# Patient Record
Sex: FEMALE | Race: WHITE
[De-identification: names, ages, dates, MRNs, and addresses within clinical notes are randomized per-mention and may not be internally consistent; named-entity substitution may affect disease eponyms.]

---

## 2019-09-14 ENCOUNTER — HOSPITAL ENCOUNTER (EMERGENCY)
Dept: HOSPITAL 75 - ER | Age: 34
Discharge: HOME | End: 2019-09-14
Payer: SELF-PAY

## 2019-09-14 VITALS — HEIGHT: 64.96 IN | BODY MASS INDEX: 26.08 KG/M2 | WEIGHT: 156.53 LBS

## 2019-09-14 VITALS — DIASTOLIC BLOOD PRESSURE: 94 MMHG | SYSTOLIC BLOOD PRESSURE: 125 MMHG

## 2019-09-14 DIAGNOSIS — F17.210: ICD-10-CM

## 2019-09-14 DIAGNOSIS — J00: Primary | ICD-10-CM

## 2019-09-14 DIAGNOSIS — J45.909: ICD-10-CM

## 2019-09-14 PROCEDURE — 87804 INFLUENZA ASSAY W/OPTIC: CPT

## 2019-09-14 NOTE — ED COUGH/URI
General


Chief Complaint:  Abdominal/GI Problems


Stated Complaint:  FEELING SICK


Nursing Triage Note:  


C/O BEING SOA VOMITING WITH COUGH. FOR 3DAYS


Sepsis Screen:  No Definite Risk


Source:  patient


Exam Limitations:  no limitations





History of Present Illness


Date Seen by Provider:  Sep 14, 2019


Time Seen by Provider:  18:29


Initial Comments


Patient presents to ER by private conveyance with chief complaint for the past 3

days she's had some coughing nausea vomiting no fevers or chills but bodyaches 

general malaise. She used some Tylenol this morning. She has a history of asthma

and feels that she can't breathe. She uses her albuterol but it does not help. 

No productive cough.





Allergies and Home Medications


Allergies


Coded Allergies:  


     No Known Drug Allergies (Unverified , 9/14/19)





Patient Home Medication List


Home Medication List Reviewed:  Yes





Review of Systems


Review of Systems


Constitutional:  No chills, No diaphoresis, No fever; malaise


EENTM:  nose congestion; No ear discharge, No ear pain


Respiratory:  cough; No phlegm; short of breath; No wheezing


Gastrointestinal:  No abdominal pain, No constipation, No diarrhea; nausea (none

presently); No vomiting


Genitourinary:  No discharge, No dysuria


Pregnant:  No


Musculoskeletal:  No back pain, No joint pain





Past Medical-Social-Family Hx


Patient Social History


Alcohol Use:  Denies Use


Recreational Drug Use:  No


Smoking Status:  Current Everyday Smoker


Type Used:  Cigarettes


Recent Foreign Travel:  No


Contact w/Someone Who Travel:  No


Recent Infectious Disease Expo:  No





Physical Exam





Vital Signs - First Documented








 9/14/19





 18:28


 


Temp 37.6


 


Pulse 83


 


Resp 18


 


B/P (MAP) 116/85 (95)


 


Pulse Ox 99


 


O2 Delivery Room Air





Capillary Refill : Less Than 3 Seconds


Height: '"


Weight: lbs. oz. kg; 26.00 BMI


Method:


General Appearance:  WD/WN, mild distress


Eyes:  Bilateral Eye Normal Inspection, Bilateral Eye PERRL, Bilateral Eye EOMI


HEENT:  PERRL/EOMI, TM abnormal (R) (mild injection but no erythema, clear 

mucoid effusion seen. No bulging or tenderness), other (mild retropharyngeal 

injection/erythema without exudate)


Respiratory:  lungs clear, normal breath sounds, no respiratory distress, no 

accessory muscle use, other (coughing)


Cardiovascular:  normal peripheral pulses, regular rate, rhythm


Gastrointestinal:  non tender, soft


Neurologic/Psychiatric:  alert, normal mood/affect, oriented x 3


Skin:  normal color, warm/dry





Progress/Results/Core Measures


Suspected Sepsis


Recent Fever Within 48 Hours:  No


Infection Criteria Present:  Suspected New Infection


New/Unexplained  Altered Menta:  No


Sepsis Screen:  No Definite Risk


SIRS


Temperature: 


Pulse: 83 


Respiratory Rate: 18


 


Blood Pressure 116 /85 


Mean: 95





Results/Orders


Micro Results





Microbiology


9/14/19 Influenza Types A,B Antigen (ATTILA) - Final, Complete


          





My Orders





Orders - ANH LICEA


Influenza A And B Antigens (9/14/19 18:39)


Naproxen Tablet (Naprosyn Tablet) (9/14/19 18:45)





Medications Given in ED





Current Medications








 Medications  Dose


 Ordered  Sig/Severiano


 Route  Start Time


 Stop Time Status Last Admin


Dose Admin


 


 Naproxen  500 mg  ONCE  ONCE


 PO  9/14/19 18:45


 9/14/19 18:46 DC 9/14/19 19:01


500 MG








Vital Signs/I&O











 9/14/19





 18:28


 


Temp 37.6


 


Pulse 83


 


Resp 18


 


B/P (MAP) 116/85 (95)


 


Pulse Ox 99


 


O2 Delivery Room Air





Capillary Refill : Less Than 3 Seconds








Blood Pressure Mean:                    95








Progress Note :  


   Time:  18:44


Progress Note


Influenza swab. Naprosyn. She doesn't have any wheezing or diminished breath 

sounds. The reason her albuterol is not helping his because she's not probably 

actually having an acute exacerbation of her asthma. This seems to be more upper

airway involvement possible influenza versus bronchitis. Would not be opposed to

some steroids if the influenza is negative. Maci Mcmahon conservative care





Departure


Impression





   Primary Impression:  


   Nasopharyngitis infective


   Additional Impression:  


   Asthma


   Qualified Codes:  J45.21 - Mild intermittent asthma with (acute) exacerbation


Disposition:  01 HOME, SELF-CARE


Condition:  Stable





Departure-Patient Inst.


Decision time for Depature:  19:15


Patient Instructions:  Cough, Runny Nose, and the Common Cold (DC)





Add. Discharge Instructions:  


Plenty of fluids to drink. You will need of rest.


Tylenol 1000 mg every 8 hours as needed for body aches or chills.


Naproxen 2 tablets twice a day or ibuprofen 4 tablets 3 times a day as needed 

for body aches fever or chills.


Use the pro-air if you're having coughing fits as prescribed.


Tessalon Perles 1 capsule every 6 hours as needed for cough.


Vapor rubs, humidifiers, hot tea with honey or abdomen.


For nasal congestion or runny nose you can use chlorpheniramine 4 mg every 4 ho

urs as needed.


All discharge instructions reviewed with patient and/or family. Voiced 

understanding.


Scripts


Prednisone (Prednisone) 20 Mg Tab


40 MG PO DAILY for 5 Days, #10 TAB 0 Refills


   Prov: ANH LICEA         9/14/19 


Benzonatate (Tessalon Perle) 100 Mg Capsule


100 MG PO Q6H PRN for COUGH, #20 CAP 0 Refills


   Prov: ANH LICEA         9/14/19


Work/School Note:  Work Release Form   Date Seen in the Emergency Department:  

Sep 14, 2019


   Return to Work:  Sep 16, 2019


   Restrictions:  No Restrictions











ANH LICEA                 Sep 14, 2019 18:47

## 2019-09-19 ENCOUNTER — HOSPITAL ENCOUNTER (EMERGENCY)
Dept: HOSPITAL 75 - ER | Age: 34
Discharge: HOME | End: 2019-09-19
Payer: SELF-PAY

## 2019-09-19 VITALS — WEIGHT: 160.28 LBS | HEIGHT: 62.99 IN | BODY MASS INDEX: 28.4 KG/M2

## 2019-09-19 VITALS — SYSTOLIC BLOOD PRESSURE: 106 MMHG | DIASTOLIC BLOOD PRESSURE: 77 MMHG
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
## 2019-09-19 DIAGNOSIS — F17.210: ICD-10-CM

## 2019-09-19 DIAGNOSIS — Z90.89: ICD-10-CM

## 2019-09-19 DIAGNOSIS — J45.909: ICD-10-CM

## 2019-09-19 DIAGNOSIS — S92.415A: Primary | ICD-10-CM

## 2019-09-19 DIAGNOSIS — S91.312A: ICD-10-CM

## 2019-09-19 DIAGNOSIS — Z23: ICD-10-CM

## 2019-09-19 PROCEDURE — 73630 X-RAY EXAM OF FOOT: CPT

## 2019-09-19 PROCEDURE — 12002 RPR S/N/AX/GEN/TRNK2.6-7.5CM: CPT

## 2019-09-19 PROCEDURE — 90715 TDAP VACCINE 7 YRS/> IM: CPT

## 2019-09-19 NOTE — DIAGNOSTIC IMAGING REPORT
INDICATION: Dropped heavy object onto the left foot.



TIME OF EXAM: 12:54 PM



FINDINGS: 3 views left foot demonstrate acute fractures involving

the proximal phalanx of the great toe. There is a fracture at the

base of the middle phalanx, lateral side with communication with

the MTP joint. There are also transversely oriented fractures

through the mid and distal shaft of the proximal phalanx of the

great toe. Remaining phalanges appear to be intact. Metatarsals

are unremarkable. Midfoot and hindfoot are unremarkable.



IMPRESSION: Proximal phalangeal fractures of the great toe, as

described.



Dictated by: 



  Dictated on workstation # [id number]

## 2019-09-19 NOTE — ED LOWER EXTREMITY
General


Chief Complaint:  Laceration


Stated Complaint:  LEFT FOOT LACERATION


Nursing Triage Note:  


PT TO [facility] WITH COMPLAINT OF LACERATION TO LEFT FOOT. PT STATES SHE WAS 


CLEANING AROUND A BENCH[employer] BAR AND IT FELL ON FOOT. DENIES ANYOTHER INJURY. 


UNK LAST TETANUS


Nursing Sepsis Screen:  No Definite Risk


Source:  patient, family


Exam Limitations:  no limitations


 (RAAD HERMOSILLO STUDENT)





History of Present Illness


Date Seen by Provider:  Sep 19, 2019


Time Seen by Provider:  12:05


Initial Comments


The patient is a wd/wn 33 y/o female who is here with a chief complaint of a 

foot laceration. The interview is conducted with the help of her  due to 

her difficulty with talking. She is having intense pain in her left first 

metatarsal. She reports that she was down in her basement and moving a barbell 

with about 120 lbs of weight when it slipped and fell on her foot. She has no 

medical problems. She has no known allergies and is not currently taking any 

medications.


Onset:  just prior to arrival


Method of Injury:  direct blow


 (RAAD HERMOSILLO STUDENT)


Onset:  just prior to arrival (at least an hour ago)


Severity:  moderate


Method of Injury:  direct blow


Modifying Factors:  Improves With Immobilization; Worse With Movement 

(JOSE L PENALOZA MD)





Allergies and Home Medications


Allergies


Coded Allergies:  


     No Known Drug Allergies (Unverified , 19)





Home Medications


Benzonatate 100 Mg Capsule, 100 MG PO Q6H PRN for COUGH


   Prescribed by: ANH LICEA on 19


Prednisone 20 Mg Tab, 40 MG PO DAILY


   Prescribed by: ANH LICEA on 19





Patient Home Medication List


Home Medication List Reviewed:  Yes


 (JOSE L PENALOZA MD)





Review of Systems


Constitutional:  no symptoms reported


Respiratory:  no symptoms reported


Cardiovascular:  no symptoms reported (RAAD HERMOSILLO STUDENT)


Constitutional:  no symptoms reported


Respiratory:  no symptoms reported


Cardiovascular:  no symptoms reported


Musculoskeletal:  see HPI, joint pain, joint swelling, muscle pain


Skin:  change in color (JOSE L PENALOZA MD)





Past Medical-Social-Family Hx


Past Med/Social Hx:  Reviewed Nursing Past Med/Soc Hx


 (JOSE L PENALOZA MD)


Patient Social History


Alcohol Use:  Rarely Uses


Recreational Drug Use:  No


Smoking Status:  Current Everyday Smoker


Type Used:  Cigarettes


Recent Foreign Travel:  No


Contact w/Someone Who Travel:  No


Recent Infectious Disease Expo:  No


Recent Hopitalizations:  No


Physical Abuse:  No


Sexual Abuse:  No


Mistreated:  No


Fear:  No


 (RAAD HERMOSILLO STUDENT)





Immunizations Up To Date


Tetanus Booster (TDap):  Unknown


PED Vaccines UTD:  Yes


 (RAAD HERMOSILLO STUDENT)





Seasonal Allergies


Seasonal Allergies:  Yes


 (RAAD HERMOSILLO)





Past Medical History


Surgeries:  No


Gallbladder, Tonsillectomy


Respiratory:  Yes


Asthma


Cardiac:  No


Neurological:  No


Genitourinary:  No


Gastrointestinal:  No


Musculoskeletal:  No


Endocrine:  No


HEENT:  No


Cancer:  No


Psychosocial:  No


Integumentary:  No


Blood Disorders:  No


 (RAAD HERMOSILLO STUDENT)





Family Medical History


Reviewed Nursing Family Hx


 (JOSE L PENALOZA MD)


No Pertinent Family Hx


 (JOSE L PENALOZA MD)





Physical Exam


Vital Signs





Vital Signs - First Documented








 19





 11:56


 


Pulse 52


 


Resp 16


 


B/P (MAP) 95/64 (74)


 


Pulse Ox 97


 


O2 Delivery Room Air





 (JOSE L PENALOZA MD)


Vital Signs


Capillary Refill : Less Than 3 Seconds 


 (RAAD HERMOSILLO STUDENT)


Height, Weight, BMI


Height: '"


Weight: lbs. oz. kg; 28.00 BMI


Method:


General Appearance:  WD/WN, no apparent distress


Neck:  non-tender, full range of motion


Cardiovascular:  regular rate, rhythm, no murmur


Respiratory:  chest non-tender, lungs clear, normal breath sounds


Feet:  left foot abrasions/lacerations, left foot limited range of motion, left 

foot pain, left foot soft tissue tenderness, left foot swelling


Neurologic/Psychiatric:  alert, normal mood/affect, oriented x 3


Skin:  normal color, warm/dry (RAAD HERMOSILLO [employer] STUDENT)


General Appearance:  WD/WN, mild distress


Cardiovascular:  regular rate, rhythm, no murmur


Respiratory:  lungs clear, normal breath sounds


Feet:  left foot abrasions/lacerations (3 cm laceration proximal to the MTP on 

the left foot first digit. There is also circular appearing abrasion/Distal to 

the laceration)


Neurologic/Psychiatric:  alert, oriented x 3


Skin:  warm/dry, ecchymosis (distal left foot dorsum around the first MTP) 

(JOSE L PENALOZA MD)





Procedures/Interventions





   Wound Location:  Lower Extremities


Other Wound Location


Left foot dorsum proximal to the MTP first digit


   Wound Length (cm):  3


   Wound's Depth, Shape:  flap


   Wound Explored:  contaminated


   Irrigated w/ Saline (ccs):  250


   Betadine Prep?:  Yes


   Anesthesia:  1% Lidocaine


   Volume Anesthetic (ccs):  10


   Wound Debrided:  minimal


   Suture:  Prolene


   Suture Size:  4-0


   Number of Sutures:  8


   Layer Closure?:  1


   Number Deep Layer Sutures:  0


   Sterile Dressing Applied?:  Yes


Progress


Cleaned with soap and saline and flushed with normal saline. Anesthetized 

locally and digital block. Wound closed with good approximation. Tolerated 

procedure well with no complications.


 (JOSE L PENALOZA MD)





Progress/Results/Core Measures


Results/Orders


My Orders





Orders - JOSE L PENALOZA MD


Foot, Left, 3 Views (19 12:05)


Ketorolac Injection (Toradol Injection) (19 12:08)


Morphine  Injection (Morphine  Injection (19 12:08)


Dipht,Pertuss(Acell),Tet Adult (Boostrix (19 13:15)


Lidocaine 2% Injection 20 Ml (Xylocaine (19 13:15)


Lidocaine 1% Inj 20 Ml (Xylocaine 1% Inj (19 13:03)


 (JOSE L PENALOZA MD)


Medications Given in ED





Current Medications








 Medications  Dose


 Ordered  Sig/Severiano


 Route  Start Time


 Stop Time Status Last Admin


Dose Admin


 


 Lidocaine HCl  20 ml  STK-MED ONCE


 .ROUTE  19 13:03


 19 13:12 DC 19 13:22


20 ML





 (JOSE L PENALOZA MD)


Vital Signs/I&O











 19





 11:56


 


Pulse 52


 


Resp 16


 


B/P (MAP) 95/64 (74)


 


Pulse Ox 97


 


O2 Delivery Room Air





 (JOSE L PENALOZA MD)








Blood Pressure Mean:                    74











Progress


Progress Note :  


   Time:  12:15


Progress Note


The patient is experiencing extreme pain in her left foot. She will be 

administered morophine and ketorolac for pain control. Plain radiographic study 

of the left foot to evaluate for broken bones.  


 (ETCHESON,RAAD K MED STUDENT)


Progress Note :  


Progress Note


I have seen and evaluated the patient and agree with above except as indicated. 

Have directed the plan of care. Patient is here with wound to the dorsum of the 

left foot near the first MTP as described above. X-ray ordered. Morphine 8 mg IM

as well as Toradol 30 mg IM. Wound cleaned and anesthetized with 1% lidocaine 

locally and digital block. Wound closed with simple rapid sutures. Tolerated 

procedure well with no complications. Postop shoe given. Crutches given. Keflex 

500 mg by mouth. Tetanus updated. Discharge home with return precautions. 

Patient verbalize understanding instructions and agreement with plan.


 (JOSE L PENALOZA MD)





Diagnostic Imaging





   Diagonstic Imaging:  Xray


   Plain Films/CT/US/NM/MRI:  other


Comments


                 [facility] [place], [place]





NAME:   MUNIR KISER


MED REC#:   [id number]


ACCOUNT#:   [id number]


PT STATUS:   REG ER


:   1985


PHYSICIAN:   JOSE L PENALOZA MD


ADMIT DATE:   19/ER


                                   ***Draft***


Date of Exam:19





FOOT, LEFT, 3 VIEWS








INDICATION: Dropped heavy object onto the left foot.





TIME OF EXAM: 12:54 PM





FINDINGS: 3 views left foot demonstrate acute fractures involving


the proximal phalanx of the great toe. There is a fracture at the


base of the middle phalanx, lateral side with communication with


the MTP joint. There are also transversely oriented fractures


through the mid and distal shaft of the proximal phalanx of the


great toe. Remaining phalanges appear to be intact. Metatarsals


are unremarkable. Midfoot and hindfoot are unremarkable.





IMPRESSION: Proximal phalangeal fractures of the great toe, as


described.





  Dictated on workstation # [id number]








Dict:   19 1303


Trans:   19 1308


 [id number]





Interpreted by:     DOUGLAS RADER MD


Electronically signed by:


   Reviewed:  Reviewed by Me


 (JOSE L PENALOZA MD)





Departure


Impression





   Primary Impression:  


   Fracture of left great toe


   Qualified Codes:  S92.415A - Nondisplaced fracture of proximal phalanx of 

   left great toe, initial encounter for closed fracture


   Additional Impression:  


   Laceration of left foot


   Qualified Codes:  S91.312A - Laceration without foreign body, left foot, 

   initial encounter


Disposition:  01 HOME, SELF-CARE


Condition:  Improved





Departure-Patient Inst.


Decision time for Depature:  14:10


 (JOSE L PENALOZA MD)


Referrals:  


ROHITH GUILLORY MD,LILIYA CORNELIUS,YAMINI SHAFFER,ESTUARDO SHEETS DPM, MD


Patient Instructions:  Toe Fracture (DC), Laceration Repair With Stitches (DC)





Add. Discharge Instructions:  


All discharge instructions reviewed with patient and/or family. Voiced understa

nding.





Use antibiotic and dressing over wound and change twice daily for the next 4-5 

days. You may use dry Band-Aid over wound after that. Sutures out in 12-14 days.

Follow-up with one of the orthopedists listed or of your choosing within one 

week for recheck and further evaluation. You may take ibuprofen 600 mg every 8 

hours as needed for pain. You may take Tylenol/acetaminophen 1000 mg every 8 

hours as needed for pain if you're not taking the prescribed pain medicine. Do 

not take both at the same time as they both have acetaminophen in them. Use ice 

packs over area of concern 20 minutes per hour as needed for the next one to 2 d

ays. Elevate the foot. Use postop shoe at all times when walking for the next 

several weeks. Use crutches for the next week or 2 as needed to keep pressure 

off the foot. Return for worse pain, swelling, weakness, breathing problems or 

other concerns as needed.


Scripts


Cephalexin (Cephalexin) 500 Mg Tablet


500 MG PO QID, #20 TAB 0 Refills


   Prov: JOSE L PENALOZA MD         19 


Hydrocodone Bit/Acetaminophen (Hydrocodone/Acetaminophen 5/325mg Tablet) 1 Tab 

Tab


1 EACH PO Q6H PRN for PAIN-MODERATE MDD 10 for 3 Days, #12 TAB 0 Refills


   Prov: JOSE L PENALOZA MD         19











RAAD HERMOSILLO MED STUDENT   Sep 19, 2019 12:14


JOSE L PENALOZA MD          Sep 19, 2019 13:39

## 2019-10-03 ENCOUNTER — HOSPITAL ENCOUNTER (EMERGENCY)
Dept: HOSPITAL 75 - ER | Age: 34
Discharge: HOME | End: 2019-10-03
Payer: SELF-PAY

## 2019-10-03 VITALS — SYSTOLIC BLOOD PRESSURE: 119 MMHG | DIASTOLIC BLOOD PRESSURE: 74 MMHG

## 2019-10-03 DIAGNOSIS — S91.312D: Primary | ICD-10-CM
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
## 2020-05-18 ENCOUNTER — HOSPITAL ENCOUNTER (OUTPATIENT)
Dept: HOSPITAL 75 - RAD | Age: 35
End: 2020-05-18
Attending: FAMILY MEDICINE
Payer: COMMERCIAL

## 2020-05-18 DIAGNOSIS — Z12.31: Primary | ICD-10-CM

## 2020-05-18 DIAGNOSIS — Z80.3: ICD-10-CM

## 2020-05-18 PROCEDURE — 77063 BREAST TOMOSYNTHESIS BI: CPT

## 2020-05-18 PROCEDURE — 77067 SCR MAMMO BI INCL CAD: CPT

## 2020-05-19 NOTE — DIAGNOSTIC IMAGING REPORT
INDICATION: 

Routine screening.



COMPARISON: 

No prior mammograms are available for comparison. This is a

baseline study.



FINDINGS:

Scattered fibroglandular densities are identified bilaterally. No

mass or malignant appearing microcalcifications are identified.

The axillae are unremarkable.



IMPRESSION: 

No mammographic features suspicious for malignancy are

identified.



ACR BI-RADS Category 1: Negative.

Result letter will be mailed to the patient.

Note:  At least 10% of breast cancer is not imaged by

mammography.



Dictated by: 



  Dictated on workstation # [id number]

## 2022-05-23 ENCOUNTER — HOSPITAL ENCOUNTER (OUTPATIENT)
Dept: HOSPITAL 75 - RAD | Age: 37
End: 2022-05-23
Attending: NURSE PRACTITIONER
Payer: COMMERCIAL

## 2022-05-23 DIAGNOSIS — Z12.31: Primary | ICD-10-CM

## 2022-05-23 PROCEDURE — 77063 BREAST TOMOSYNTHESIS BI: CPT

## 2022-05-23 PROCEDURE — 77067 SCR MAMMO BI INCL CAD: CPT

## 2022-05-23 NOTE — DIAGNOSTIC IMAGING REPORT
INDICATION: Routine screening.



Comparison is made with prior mammogram 05/18/2020.



2-D and 3-D bilateral screening mammography was performed with

CAD.



Scattered fibroglandular densities are identified bilaterally.

The parenchymal  pattern is stable. No mass or

malignant-appearing microcalcifications are seen. Axillae are

unremarkable.



IMPRESSION: No mammographic features suspicious for malignancy

are identified.



ACR BI-RADS Category 1: Negative.

Result letter will be mailed to the patient.

Note:  At least 10% of breast cancer is not imaged by

mammography.



BI-RADS Category 1



Dictated by: 



  Dictated on workstation # [id number]